# Patient Record
Sex: FEMALE | Race: ASIAN | NOT HISPANIC OR LATINO | Employment: FULL TIME | URBAN - METROPOLITAN AREA
[De-identification: names, ages, dates, MRNs, and addresses within clinical notes are randomized per-mention and may not be internally consistent; named-entity substitution may affect disease eponyms.]

---

## 2022-03-30 LAB
ABO (EXTERNAL): NORMAL
RH (EXTERNAL): POSITIVE

## 2022-04-21 LAB
HEPATITIS B SURFACE ANTIGEN (EXTERNAL): NEGATIVE
HIV1+2 AB SERPL QL IA: NEGATIVE
RUBELLA ANTIBODY IGG (EXTERNAL): NORMAL
VDRL (SYPHILIS) (EXTERNAL): NEGATIVE

## 2022-11-10 LAB — GROUP B STREPTOCOCCUS (EXTERNAL): NEGATIVE

## 2022-11-17 ENCOUNTER — HOSPITAL ENCOUNTER (INPATIENT)
Facility: CLINIC | Age: 22
LOS: 1 days | Discharge: HOME | End: 2022-11-18
Attending: OBSTETRICS & GYNECOLOGY | Admitting: OBSTETRICS & GYNECOLOGY
Payer: COMMERCIAL

## 2022-11-17 PROBLEM — Z34.90 PREGNANCY: Status: ACTIVE | Noted: 2022-11-17

## 2022-11-17 PROBLEM — Z34.90 PREGNANCY: Status: RESOLVED | Noted: 2022-11-17 | Resolved: 2022-11-17

## 2022-11-17 LAB
ABO/RH(D): NORMAL
ANTIBODY SCREEN: NEGATIVE
HOLD SPECIMEN: NORMAL
HOLD SPECIMEN: NORMAL
SARS-COV-2 RNA RESP QL NAA+PROBE: NEGATIVE
SPECIMEN EXPIRATION DATE: NORMAL
T PALLIDUM AB SER QL: NONREACTIVE

## 2022-11-17 PROCEDURE — U0003 INFECTIOUS AGENT DETECTION BY NUCLEIC ACID (DNA OR RNA); SEVERE ACUTE RESPIRATORY SYNDROME CORONAVIRUS 2 (SARS-COV-2) (CORONAVIRUS DISEASE [COVID-19]), AMPLIFIED PROBE TECHNIQUE, MAKING USE OF HIGH THROUGHPUT TECHNOLOGIES AS DESCRIBED BY CMS-2020-01-R: HCPCS | Performed by: OBSTETRICS & GYNECOLOGY

## 2022-11-17 PROCEDURE — 250N000011 HC RX IP 250 OP 636: Performed by: OBSTETRICS & GYNECOLOGY

## 2022-11-17 PROCEDURE — 250N000013 HC RX MED GY IP 250 OP 250 PS 637: Performed by: OBSTETRICS & GYNECOLOGY

## 2022-11-17 PROCEDURE — 36415 COLL VENOUS BLD VENIPUNCTURE: CPT | Performed by: OBSTETRICS & GYNECOLOGY

## 2022-11-17 PROCEDURE — 0UQGXZZ REPAIR VAGINA, EXTERNAL APPROACH: ICD-10-PCS | Performed by: OBSTETRICS & GYNECOLOGY

## 2022-11-17 PROCEDURE — 86901 BLOOD TYPING SEROLOGIC RH(D): CPT | Performed by: OBSTETRICS & GYNECOLOGY

## 2022-11-17 PROCEDURE — 0HQ9XZZ REPAIR PERINEUM SKIN, EXTERNAL APPROACH: ICD-10-PCS | Performed by: OBSTETRICS & GYNECOLOGY

## 2022-11-17 PROCEDURE — C9803 HOPD COVID-19 SPEC COLLECT: HCPCS

## 2022-11-17 PROCEDURE — 86850 RBC ANTIBODY SCREEN: CPT | Performed by: OBSTETRICS & GYNECOLOGY

## 2022-11-17 PROCEDURE — 722N000001 HC LABOR CARE VAGINAL DELIVERY SINGLE

## 2022-11-17 PROCEDURE — 86780 TREPONEMA PALLIDUM: CPT | Performed by: OBSTETRICS & GYNECOLOGY

## 2022-11-17 PROCEDURE — 120N000001 HC R&B MED SURG/OB

## 2022-11-17 PROCEDURE — 250N000009 HC RX 250: Performed by: OBSTETRICS & GYNECOLOGY

## 2022-11-17 RX ORDER — METOCLOPRAMIDE 10 MG/1
10 TABLET ORAL EVERY 6 HOURS PRN
Status: DISCONTINUED | OUTPATIENT
Start: 2022-11-17 | End: 2022-11-17 | Stop reason: HOSPADM

## 2022-11-17 RX ORDER — MISOPROSTOL 200 UG/1
800 TABLET ORAL
Status: DISCONTINUED | OUTPATIENT
Start: 2022-11-17 | End: 2022-11-18 | Stop reason: HOSPADM

## 2022-11-17 RX ORDER — NALOXONE HYDROCHLORIDE 0.4 MG/ML
0.2 INJECTION, SOLUTION INTRAMUSCULAR; INTRAVENOUS; SUBCUTANEOUS
Status: DISCONTINUED | OUTPATIENT
Start: 2022-11-17 | End: 2022-11-17 | Stop reason: HOSPADM

## 2022-11-17 RX ORDER — HYDROCORTISONE 25 MG/G
CREAM TOPICAL 3 TIMES DAILY PRN
Status: DISCONTINUED | OUTPATIENT
Start: 2022-11-17 | End: 2022-11-18 | Stop reason: HOSPADM

## 2022-11-17 RX ORDER — METHYLERGONOVINE MALEATE 0.2 MG/ML
200 INJECTION INTRAVENOUS
Status: DISCONTINUED | OUTPATIENT
Start: 2022-11-17 | End: 2022-11-18 | Stop reason: HOSPADM

## 2022-11-17 RX ORDER — OXYTOCIN/0.9 % SODIUM CHLORIDE 30/500 ML
340 PLASTIC BAG, INJECTION (ML) INTRAVENOUS CONTINUOUS PRN
Status: DISCONTINUED | OUTPATIENT
Start: 2022-11-17 | End: 2022-11-17 | Stop reason: HOSPADM

## 2022-11-17 RX ORDER — IBUPROFEN 800 MG/1
800 TABLET, FILM COATED ORAL EVERY 6 HOURS PRN
Status: DISCONTINUED | OUTPATIENT
Start: 2022-11-17 | End: 2022-11-18 | Stop reason: HOSPADM

## 2022-11-17 RX ORDER — BISACODYL 10 MG
10 SUPPOSITORY, RECTAL RECTAL DAILY PRN
Status: DISCONTINUED | OUTPATIENT
Start: 2022-11-17 | End: 2022-11-18 | Stop reason: HOSPADM

## 2022-11-17 RX ORDER — CITRIC ACID/SODIUM CITRATE 334-500MG
30 SOLUTION, ORAL ORAL
Status: DISCONTINUED | OUTPATIENT
Start: 2022-11-17 | End: 2022-11-17 | Stop reason: HOSPADM

## 2022-11-17 RX ORDER — METHYLERGONOVINE MALEATE 0.2 MG/ML
200 INJECTION INTRAVENOUS
Status: DISCONTINUED | OUTPATIENT
Start: 2022-11-17 | End: 2022-11-17 | Stop reason: HOSPADM

## 2022-11-17 RX ORDER — PRENATAL VIT/IRON FUM/FOLIC AC 27MG-0.8MG
1 TABLET ORAL DAILY
COMMUNITY

## 2022-11-17 RX ORDER — ONDANSETRON 2 MG/ML
4 INJECTION INTRAMUSCULAR; INTRAVENOUS EVERY 6 HOURS PRN
Status: DISCONTINUED | OUTPATIENT
Start: 2022-11-17 | End: 2022-11-17 | Stop reason: HOSPADM

## 2022-11-17 RX ORDER — MISOPROSTOL 200 UG/1
400 TABLET ORAL
Status: DISCONTINUED | OUTPATIENT
Start: 2022-11-17 | End: 2022-11-18 | Stop reason: HOSPADM

## 2022-11-17 RX ORDER — OXYTOCIN 10 [USP'U]/ML
10 INJECTION, SOLUTION INTRAMUSCULAR; INTRAVENOUS
Status: COMPLETED | OUTPATIENT
Start: 2022-11-17 | End: 2022-11-17

## 2022-11-17 RX ORDER — DOCUSATE SODIUM 100 MG/1
100 CAPSULE, LIQUID FILLED ORAL DAILY
Status: DISCONTINUED | OUTPATIENT
Start: 2022-11-17 | End: 2022-11-18 | Stop reason: HOSPADM

## 2022-11-17 RX ORDER — OXYTOCIN 10 [USP'U]/ML
10 INJECTION, SOLUTION INTRAMUSCULAR; INTRAVENOUS
Status: DISCONTINUED | OUTPATIENT
Start: 2022-11-17 | End: 2022-11-18 | Stop reason: HOSPADM

## 2022-11-17 RX ORDER — OXYTOCIN/0.9 % SODIUM CHLORIDE 30/500 ML
100-340 PLASTIC BAG, INJECTION (ML) INTRAVENOUS CONTINUOUS PRN
Status: DISCONTINUED | OUTPATIENT
Start: 2022-11-17 | End: 2022-11-18 | Stop reason: HOSPADM

## 2022-11-17 RX ORDER — PROCHLORPERAZINE MALEATE 10 MG
10 TABLET ORAL EVERY 6 HOURS PRN
Status: DISCONTINUED | OUTPATIENT
Start: 2022-11-17 | End: 2022-11-17 | Stop reason: HOSPADM

## 2022-11-17 RX ORDER — MODIFIED LANOLIN
OINTMENT (GRAM) TOPICAL
Status: DISCONTINUED | OUTPATIENT
Start: 2022-11-17 | End: 2022-11-18 | Stop reason: HOSPADM

## 2022-11-17 RX ORDER — OXYTOCIN 10 [USP'U]/ML
10 INJECTION, SOLUTION INTRAMUSCULAR; INTRAVENOUS
Status: DISCONTINUED | OUTPATIENT
Start: 2022-11-17 | End: 2022-11-17 | Stop reason: HOSPADM

## 2022-11-17 RX ORDER — CARBOPROST TROMETHAMINE 250 UG/ML
250 INJECTION, SOLUTION INTRAMUSCULAR
Status: DISCONTINUED | OUTPATIENT
Start: 2022-11-17 | End: 2022-11-18 | Stop reason: HOSPADM

## 2022-11-17 RX ORDER — KETOROLAC TROMETHAMINE 30 MG/ML
30 INJECTION, SOLUTION INTRAMUSCULAR; INTRAVENOUS
Status: DISCONTINUED | OUTPATIENT
Start: 2022-11-17 | End: 2022-11-18 | Stop reason: HOSPADM

## 2022-11-17 RX ORDER — MISOPROSTOL 200 UG/1
800 TABLET ORAL
Status: DISCONTINUED | OUTPATIENT
Start: 2022-11-17 | End: 2022-11-17 | Stop reason: HOSPADM

## 2022-11-17 RX ORDER — NALOXONE HYDROCHLORIDE 0.4 MG/ML
0.4 INJECTION, SOLUTION INTRAMUSCULAR; INTRAVENOUS; SUBCUTANEOUS
Status: DISCONTINUED | OUTPATIENT
Start: 2022-11-17 | End: 2022-11-17 | Stop reason: HOSPADM

## 2022-11-17 RX ORDER — ACETAMINOPHEN 325 MG/1
650 TABLET ORAL EVERY 4 HOURS PRN
Status: DISCONTINUED | OUTPATIENT
Start: 2022-11-17 | End: 2022-11-18 | Stop reason: HOSPADM

## 2022-11-17 RX ORDER — IBUPROFEN 800 MG/1
800 TABLET, FILM COATED ORAL
Status: DISCONTINUED | OUTPATIENT
Start: 2022-11-17 | End: 2022-11-18 | Stop reason: HOSPADM

## 2022-11-17 RX ORDER — MISOPROSTOL 200 UG/1
400 TABLET ORAL
Status: DISCONTINUED | OUTPATIENT
Start: 2022-11-17 | End: 2022-11-17 | Stop reason: HOSPADM

## 2022-11-17 RX ORDER — CARBOPROST TROMETHAMINE 250 UG/ML
250 INJECTION, SOLUTION INTRAMUSCULAR
Status: DISCONTINUED | OUTPATIENT
Start: 2022-11-17 | End: 2022-11-17 | Stop reason: HOSPADM

## 2022-11-17 RX ORDER — METOCLOPRAMIDE HYDROCHLORIDE 5 MG/ML
10 INJECTION INTRAMUSCULAR; INTRAVENOUS EVERY 6 HOURS PRN
Status: DISCONTINUED | OUTPATIENT
Start: 2022-11-17 | End: 2022-11-17 | Stop reason: HOSPADM

## 2022-11-17 RX ORDER — OXYTOCIN/0.9 % SODIUM CHLORIDE 30/500 ML
340 PLASTIC BAG, INJECTION (ML) INTRAVENOUS CONTINUOUS PRN
Status: DISCONTINUED | OUTPATIENT
Start: 2022-11-17 | End: 2022-11-18 | Stop reason: HOSPADM

## 2022-11-17 RX ORDER — ONDANSETRON 4 MG/1
4 TABLET, ORALLY DISINTEGRATING ORAL EVERY 6 HOURS PRN
Status: DISCONTINUED | OUTPATIENT
Start: 2022-11-17 | End: 2022-11-17 | Stop reason: HOSPADM

## 2022-11-17 RX ORDER — PROCHLORPERAZINE 25 MG
25 SUPPOSITORY, RECTAL RECTAL EVERY 12 HOURS PRN
Status: DISCONTINUED | OUTPATIENT
Start: 2022-11-17 | End: 2022-11-17 | Stop reason: HOSPADM

## 2022-11-17 RX ORDER — LIDOCAINE 40 MG/G
CREAM TOPICAL
Status: DISCONTINUED | OUTPATIENT
Start: 2022-11-17 | End: 2022-11-17 | Stop reason: HOSPADM

## 2022-11-17 RX ADMIN — EPSOM SALT: 1 GRANULE ORAL; TOPICAL at 18:23

## 2022-11-17 RX ADMIN — ACETAMINOPHEN 650 MG: 325 TABLET, FILM COATED ORAL at 13:40

## 2022-11-17 RX ADMIN — DOCUSATE SODIUM 100 MG: 100 CAPSULE, LIQUID FILLED ORAL at 21:43

## 2022-11-17 RX ADMIN — ACETAMINOPHEN 650 MG: 325 TABLET, FILM COATED ORAL at 23:17

## 2022-11-17 RX ADMIN — OXYTOCIN 10 UNITS: 10 INJECTION INTRAVENOUS at 08:44

## 2022-11-17 RX ADMIN — BENZOCAINE AND LEVOMENTHOL: 200; 5 SPRAY TOPICAL at 13:40

## 2022-11-17 RX ADMIN — DOCUSATE SODIUM 100 MG: 100 CAPSULE, LIQUID FILLED ORAL at 10:22

## 2022-11-17 RX ADMIN — ACETAMINOPHEN 650 MG: 325 TABLET, FILM COATED ORAL at 18:32

## 2022-11-17 RX ADMIN — HYDROCORTISONE: 25 CREAM TOPICAL at 18:23

## 2022-11-17 RX ADMIN — IBUPROFEN 800 MG: 800 TABLET ORAL at 15:02

## 2022-11-17 RX ADMIN — IBUPROFEN 800 MG: 800 TABLET ORAL at 21:43

## 2022-11-17 RX ADMIN — KETOROLAC TROMETHAMINE 30 MG: 30 INJECTION, SOLUTION INTRAMUSCULAR; INTRAVENOUS at 08:51

## 2022-11-17 RX ADMIN — LIDOCAINE HYDROCHLORIDE 20 ML: 10 INJECTION, SOLUTION INFILTRATION; PERINEURAL at 08:48

## 2022-11-17 ASSESSMENT — ACTIVITIES OF DAILY LIVING (ADL)
ADLS_ACUITY_SCORE: 18
DRESSING/BATHING_DIFFICULTY: NO
TOILETING_ISSUES: NO
WALKING_OR_CLIMBING_STAIRS_DIFFICULTY: NO
CHANGE_IN_FUNCTIONAL_STATUS_SINCE_ONSET_OF_CURRENT_ILLNESS/INJURY: NO
ADLS_ACUITY_SCORE: 18
ADLS_ACUITY_SCORE: 18
DOING_ERRANDS_INDEPENDENTLY_DIFFICULTY: NO
ADLS_ACUITY_SCORE: 18
HEARING_DIFFICULTY_OR_DEAF: NO
ADLS_ACUITY_SCORE: 18
WEAR_GLASSES_OR_BLIND: NO
CONCENTRATING,_REMEMBERING_OR_MAKING_DECISIONS_DIFFICULTY: NO
FALL_HISTORY_WITHIN_LAST_SIX_MONTHS: NO
DIFFICULTY_EATING/SWALLOWING: NO
ADLS_ACUITY_SCORE: 18
DIFFICULTY_COMMUNICATING: NO

## 2022-11-17 NOTE — PLAN OF CARE
Patient up to tub x 1, voiding independently and doing perineal care of ice pack, tucks, dermaplast.  Pain being managed with oral pain meds and topical.  Working with mom on breast feeding every 3 hours.  Baby able to latch at 1500 feeding and fed well.

## 2022-11-17 NOTE — L&D DELIVERY NOTE
OB Vaginal Delivery Note    Balbir Pearson MRN# [id number]   Age: 22 year old YOB: 2000     Predelivery Diagnosis:  1) 22 year old  at 37w5d      2) GBS negative   3) COVID negative  4) SROM with subsequent spontaneous labor     Postdelivery Diagnosis:  1) 22 year old  @ 37w5d   2) Delivery of a viable male     Delivery Type: Vaginal, Spontaneous    Weight: 2.892 kg (6 lb 6 oz)    1 Minute 5 Minute 10 Minute   Apgar Totals: 8   9        Delivery Personnel: BLAKE LAU;MALCOM HANNA   Delivery Physician: Areli Vang MD    Augmentation: N/A  Induction: N/A    Internal Monitors: None   ROM:  SROM  Fluid type: Clear  Labor analgesia/anesthesia: none     Labor Complications: None   Delivery QBL:  150 ml     Delivery Details:  Balbir Pearson, a 22 year old  female with prenatal care with [facility], and pregnancy complicated by nothing, presented to L&D with SROM and subsequent labor.    Her labor course was complicated by nothing.  Patient became fully dilated and pushed in active labor. Delivery was via vaginal, spontaneous  under none  anesthesia. Infant delivered in vertex  middle  occiput  anterior  position. The shoulders were delivered in usual fashion.  The cord was clamped x 2, cut, and 3 vessels  were noted.     Live, vigorous infant was handed to Mom.      Shoulder Dystocia: No     Operative Vaginal Delivery: No    Cord complications: nuchal   Placenta delivered, was inspected and found to be intact.  Placental disposition was Hospital disposal .     Episiotomy: none    Perineum, vagina, cervix were inspected, and the following lacerations were noted:   Perineal lacerations: 1st  degree perineal with a left vaginal sulcal tear, repaired. Right periurethral hemostatic and not repaired. Any lacerations were repaired in the standard fashion using local anesthesia and 3-0 vicryl suture.  Excellent hemostasis was noted. Needle/sharps count, laparotomy sponge count was  correct.     Infant and patient in delivery room in good and stable condition.     Delivery was complicated by nothing.  Interventions included IM pitocin.     Areli Vang MD       Dann Pearson [[id number]]    Labor Event Times    Labor onset date: 22 Onset time:  4:30 AM   Dilation complete date: 22    Start pushing date/time: 2022 0800      Labor Events     labor?: No   steroids: None  Labor Type: Spontaneous  Predominate monitoring during 1st stage: continuous electronic fetal monitoring     Antibiotics received during labor?: No     Rupture date/time: 22 0330   Rupture type: Spontaneous rupture of membranes occuring during spontaneous labor or augmentation  Fluid color: Clear  Fluid odor: Normal     Augmentation: None  1:1 continuous labor support provided by?: RN Labor partogram used?: no      Delivery/Placenta Date and Time    Delivery Date: 22 Delivery Time:  8:36 AM   Placenta Date/Time: 2022  8:43 AM  Oxytocin given at the time of delivery: after delivery of placenta  Delivering clinician: Areli Vang MD   Other personnel present at delivery:  Provider Role   Blake Lau RN Dirkes, Meghan, RN          Vaginal Counts     Initial count performed by 2 team members:  Two Team Members   nidia vang       Needles Suture Needles Sponges (RETIRED) Instruments   Initial counts 1 1 5    Added to count       Relief counts       Final counts             Placed during labor Accounted for at the end of labor   FSE No NA   IUPC No NA   Cervidil No NA              Final count performed by 2 team members:  Two Team Members   nidia vang         Apgars    Living status: Living   1 Minute 5 Minute 10 Minute 15 Minute 20 Minute   Skin color: 1  1       Heart rate: 2  2       Reflex irritability: 2  2       Muscle tone: 2  2       Respiratory effort: 1  2       Total: 8  9       Apgars assigned by: BLAKE LAU RN     Cord    Vessels:  "3 Vessels    Cord Complications: Nuchal   Nuchal Intervention: delivered through         Nuchal cord description: loose nuchal cord         Cord Blood Disposition: Discard    Gases Sent?: No    Delayed cord clamping?: Yes    Cord Clamping Delay (seconds):  seconds    Stem cell collection?: No        Resuscitation    Methods: None      Measurements    Weight: 6 lb 6 oz Length: 1' 6.5\"   Head circumference: 32.5 cm       Skin to Skin and Feeding Plan    Skin to skin initiation date/time: 1841    Skin to skin with: Mother  Skin to skin end date/time:        Labor Events and Shoulder Dystocia    Fetal Tracing Prior to Delivery: Category 2  Shoulder dystocia present?: Neg     Delivery (Maternal) (Provider to Complete) (829700)    Episiotomy: None  Perineal lacerations: 1st Repaired?: Yes   Periurethral laceration: right Repaired?: No   Vaginal laceration?: Yes Repaired?: Yes   Repair suture: 3-0 Vicryl  Genital tract inspection done: Pos     Blood Loss  Mother: Balbir Pearson #[id number]   Start of Mother's Information    Delivery Blood Loss  22 0430 - 22 0935    None           End of Mother's Information  Mother: Balbir Pearson #[id number]          Delivery - Provider to Complete ([id number])    Delivering clinician: Areli Vang MD  Delivery Type (Choose the 1 that will go to the Birth History): Vaginal, Spontaneous                   Other personnel:  Provider Role   Lorraine Mcghee, ESTHER    DirEsperanza dallas, ESTHER                 Placenta    Date/Time: 2022  8:43 AM  Removal: Spontaneous  Disposition: Hospital disposal           Anesthesia    Method: None                Presentation and Position    Presentation: Vertex    Position: Middle Occiput Anterior                 Areli Vang MD    2022 9:16 AM     "

## 2022-11-17 NOTE — H&P
[facility] Labor and Delivery History and Physical    Balbir Pearson MRN# [id number]   Age: 22 year old YOB: 2000     Date of Admission:  2022    Primary care provider: Brisa Adamson           Chief Complaint:   Balbir Pearson is a 22 year old female who is 37w5d pregnant and being admitted for SROM. Subsequently began painful contractions.           Pregnancy history:     OBSTETRIC HISTORY:    OB History    Para Term  AB Living   1 0 0 0 0 0   SAB IAB Ectopic Multiple Live Births   0 0 0 0 0      # Outcome Date GA Lbr Rayray/2nd Weight Sex Delivery Anes PTL Lv   1 Current                EDC: Estimated Date of Delivery: 12/3/22    1) Asymptomatic bacteriuria, negative test of cure  2) GBS negative  3) COVID negative    Prenatal Labs:   Lab Results   Component Value Date    AS Negative 2022       GBS Status:   Lab Results   Component Value Date    GBS Negative 11/10/2022       Active Problem List  Patient Active Problem List   Diagnosis     Pregnancy       Medication Prior to Admission  Medications Prior to Admission   Medication Sig Dispense Refill Last Dose     Prenatal Vit-Fe Fumarate-FA (PRENATAL MULTIVITAMIN W/IRON) 27-0.8 MG tablet Take 1 tablet by mouth daily   2022   .        Maternal Past Medical History:   No past medical history on file.                    Family History:   No family history on file.  Family history reviewed            Social History:     Social History     Tobacco Use     Smoking status: Not on file     Smokeless tobacco: Not on file   Substance Use Topics     Alcohol use: Not on file            Review of Systems:   The Review of Systems is negative other than noted in the HPI          Physical Exam:     Vitals were reviewed  Patient Vitals for the past 8 hrs:   BP Temp Temp src Height Weight   22 0723 115/76 97.7  F (36.5  C) Oral -- --   22 0630 -- 98.4  F (36.9  C) Oral -- --   22 0510 109/64 98  F (36.7  " C) Oral 1.638 m (5' 4.5\") 64 kg (141 lb)   22 0412 115/81 97.7  F (36.5  C) Oral -- --     Constitutional:   awake, alert, cooperative, distress with contractions, and appears stated age     Lungs:   No increased work of breathing, good air exchange     Cardiovascular:   Regular rate and rhythm     Abdomen:   Gravid, soft between contractions     Musculoskeletal:   no lower extremity pitting edema present  there is no redness, warmth, or swelling of the joints     Neuropsychiatric:   General: normal, calm and normal eye contact  Level of consciousness: alert / normal  Affect: normal      Cervix:   Membranes: SROM   Dilation: 9.5 with a reducible lip   Effacement: 100%   Station:+1   Consistency: average   Position: Anterior  Presentation:Cephalic  Fetal Heart Rate Tracing: reactive and reassuring  Tocometer: external monitor and frequency q 2-3 minutes                       Assessment:   Balbir Pearson is a 37w5d pregnant female admitted with SROM and subsequent spontaneous labor        Plan:   Admit - see IP orders  Anticipate     Areli Vang MD  "

## 2022-11-17 NOTE — PROGRESS NOTES
Pt arrived to unit with c/o SROM, clear fluid. Pt continues to leak copious amount of fluid. Updated Dr Vang, plan to admit pt.

## 2022-11-18 VITALS
SYSTOLIC BLOOD PRESSURE: 105 MMHG | HEART RATE: 69 BPM | WEIGHT: 141 LBS | BODY MASS INDEX: 23.49 KG/M2 | DIASTOLIC BLOOD PRESSURE: 98 MMHG | TEMPERATURE: 98.6 F | RESPIRATION RATE: 18 BRPM | OXYGEN SATURATION: 98 % | HEIGHT: 65 IN

## 2022-11-18 PROCEDURE — 250N000013 HC RX MED GY IP 250 OP 250 PS 637: Performed by: OBSTETRICS & GYNECOLOGY

## 2022-11-18 RX ORDER — IBUPROFEN 800 MG/1
800 TABLET, FILM COATED ORAL EVERY 8 HOURS PRN
Qty: 30 TABLET | Refills: 1 | Status: SHIPPED | OUTPATIENT
Start: 2022-11-18

## 2022-11-18 RX ADMIN — ACETAMINOPHEN 650 MG: 325 TABLET, FILM COATED ORAL at 04:25

## 2022-11-18 RX ADMIN — ACETAMINOPHEN 650 MG: 325 TABLET, FILM COATED ORAL at 09:45

## 2022-11-18 RX ADMIN — IBUPROFEN 800 MG: 800 TABLET ORAL at 10:57

## 2022-11-18 RX ADMIN — DOCUSATE SODIUM 100 MG: 100 CAPSULE, LIQUID FILLED ORAL at 09:45

## 2022-11-18 RX ADMIN — IBUPROFEN 800 MG: 800 TABLET ORAL at 04:25

## 2022-11-18 ASSESSMENT — ACTIVITIES OF DAILY LIVING (ADL)
ADLS_ACUITY_SCORE: 18

## 2022-11-18 NOTE — PLAN OF CARE
Problem: Postpartum (Vaginal Delivery)  Goal: Successful Maternal Role Transition  Outcome: Met  Goal: Hemostasis  Outcome: Met  Goal: Absence of Infection Signs and Symptoms  Outcome: Met  Goal: Optimal Pain Control and Function  Outcome: Met

## 2022-11-18 NOTE — PROGRESS NOTES
Called to stand by until provider arrived for possible precipitous delivery.  21yo G1 at 37w5d who arrived in spontaneous labor.  She progressed to 9cm and has veyr strong desire to push. On exam was anterior lip on right side.  FHT Cat I.  I stool at bedside for 25 minutes until Dr. Vang arrived for delivery.      Marlene Curtis MD

## 2022-11-18 NOTE — DISCHARGE SUMMARY
[facility] Discharge Summary    Balbir Pearson MRN# [id number]   Age: 22 year old YOB: 2000     Date of Admission:  2022  Date of Discharge::  2022  Admitting Physician:  Areli Vang MD  Discharge Physician:  Areli Vang MD     Home clinic: [facility]          Admission Diagnoses:   1) 23yo  at 37w5d  2) SROM and subsequent spontaneous labor  3) GBS negative  4) COVID negative          Discharge Diagnosis:   Normal spontaneous vaginal delivery  Intrauterine pregnancy at 37w5d gestation  Male infant, 6#6, APGARs 8/9  Vulvar hematoma, small and stable in size          Procedures:   Procedure(s): No additional procedures performed       No other procedures performed during this admission           Medications Prior to Admission:     Medications Prior to Admission   Medication Sig Dispense Refill Last Dose     Prenatal Vit-Fe Fumarate-FA (PRENATAL MULTIVITAMIN W/IRON) 27-0.8 MG tablet Take 1 tablet by mouth daily   2022             Discharge Medications:     Current Discharge Medication List      START taking these medications    Details   ibuprofen (ADVIL/MOTRIN) 800 MG tablet Take 1 tablet (800 mg) by mouth every 8 hours as needed for moderate pain (4-6)  Qty: 30 tablet, Refills: 1    Associated Diagnoses: Normal delivery         CONTINUE these medications which have NOT CHANGED    Details   Prenatal Vit-Fe Fumarate-FA (PRENATAL MULTIVITAMIN W/IRON) 27-0.8 MG tablet Take 1 tablet by mouth daily                   Consultations:   Social work due to high postpartum depression screen          Brief History of Labor:   Patient presented with SROM and initially no contractions. Began having contractions a few hours after admission and progressed quickly to 9 cm. Underwent .           Hospital Course:   The patient's hospital course was unremarkable. She did have a small (2-3 cm) vulvar hematoma, stable in size and not causing concerns. On discharge, her pain  was well controlled. Vaginal bleeding is similar to peak menstrual flow.  Voiding without difficulty.  Ambulating well and tolerating a normal diet.  No fever.  Breastfeeding ok with supplementation.  Infant is stable.  No bowel movement yet.  She was discharged on post-partum day #1.    Post-partum hemoglobin: No results found for: HGB          Discharge Instructions and Follow-Up:   Discharge diet: Regular   Discharge activity: No heavy lifting, pushing, pulling for 6 week(s)  Pelvic rest: abstain from intercourse and do not use tampons for 6 week(s)   Discharge follow-up: Follow up with [facility] in 6 weeks, sooner with concerns of peripartum mood issues   Wound care: Drink plenty of fluids  Ice to area for comfort  Keep wound clean and dry           Discharge Disposition:   Discharged to home      Attestation:  I have reviewed today's vital signs, notes, medications, labs and imaging.    Areli Vang MD      7

## 2022-11-18 NOTE — PROGRESS NOTES
Patient discharged home with NB. All discharge instructions reviewed with patient and significant other and given to patient. All questions answered to patient's satisfaction.

## 2022-11-18 NOTE — CONSULTS
Met with pt regarding elevated Streator score.  Pt sates history of depression in high school.  Pt is not currently or in the past been prescribed mediation for depression.  Pt has not been to therapist in past but is interested in seeing a therapist.  Pt requesting mental health clinic resources at this time and wanting to set up own appointment.  Resources provided to pt.  Pt feels safe in returning home, feels well supported.  Pt denies any suicidal or homicidal thoughts at this time.  Per chart pt will have home care visit after discharge.  JOURDAN Ann  [facility]  11/18/2022   2:48 PM

## 2022-11-18 NOTE — PROGRESS NOTES
"Outreach Note for EPIC    Chart reviewed, discharge plan discussed with patient, needs assessed. Patient verbalizes understanding of plan, requests [facility] visit as ordered, [facility] nurse visit planned for Sun, , Home Care Intake updated. Patient and , \"Raina Barnhart\", phone number is reported as correct in EMR.    Patient states she has good support at home, has baby care essentials, and feels ready to discharge today. Outreach RN will continue to follow and assist if needed with discharge plan. No additional needs identified at this time.        "

## 2022-11-18 NOTE — DISCHARGE INSTRUCTIONS
A Homecare Visit is set up on Sun, Nov 20th. The RN will call you after 4 p.m. the evening before the visit with a time. Please do not make a clinic visit for the same day as your Homecare Visit. You can contact [facility] at [phone] if you have any further questions related to the home visit.  Postpartum Vaginal Delivery Instructions    Activity     Ask family and friends for help when you need it.  Do not place anything in your vagina for 6 weeks.  You are not restricted on other activities, but take it easy for a few weeks to allow your body to recover from delivery.  You are able to do any activities you feel up to that point.  No driving until you have stopped taking your pain medications (usually two weeks after delivery).     Call your health care provider if you have any of these symptoms:     Increased pain, swelling, redness, or fluid around your stiches from an episiotomy or perineal tear.  A fever above 100.4 F (38 C) with or without chills when placing a thermometer under your tongue.  You soak a sanitary pad with blood within 1 hour, or you see blood clots larger than a golf ball.  Bleeding that lasts more than 6 weeks.  Vaginal discharge that smells bad.  Severe pain, cramping or tenderness in your lower belly area.  A need to urinate more frequently (use the toilet more often), more urgently (use the toilet very quickly), or it burns when you urinate.  Nausea and vomiting.  Redness, swelling or pain around a vein in your leg.  Problems breastfeeding or a red or painful area on your breast.  Chest pain and cough or are gasping for air.  Problems coping with sadness, anxiety, or depression.  If you have any concerns about hurting yourself or the baby, call your provider immediately.   You have questions or concerns after you return home.     Keep your hands clean:  Always wash your hands before touching your perineal area and stitches.  This helps reduce your risk of infection.  If your  hands aren't dirty, you may use an alcohol hand-rub to clean your hands. Keep your nails clean and short.

## 2023-02-10 ENCOUNTER — MEDICAL CORRESPONDENCE (OUTPATIENT)
Dept: HEALTH INFORMATION MANAGEMENT | Facility: CLINIC | Age: 23
End: 2023-02-10

## 2023-02-12 ENCOUNTER — HEALTH MAINTENANCE LETTER (OUTPATIENT)
Age: 23
End: 2023-02-12

## 2023-04-11 ENCOUNTER — MEDICAL CORRESPONDENCE (OUTPATIENT)
Dept: HEALTH INFORMATION MANAGEMENT | Facility: CLINIC | Age: 23
End: 2023-04-11

## 2023-10-11 ENCOUNTER — OFFICE VISIT (OUTPATIENT)
Dept: FAMILY MEDICINE | Facility: CLINIC | Age: 23
End: 2023-10-11
Payer: COMMERCIAL

## 2023-10-11 ENCOUNTER — HOSPITAL ENCOUNTER (OUTPATIENT)
Dept: CT IMAGING | Facility: HOSPITAL | Age: 23
Discharge: HOME | End: 2023-10-11
Attending: FAMILY MEDICINE | Admitting: FAMILY MEDICINE
Payer: COMMERCIAL

## 2023-10-11 ENCOUNTER — NURSE TRIAGE (OUTPATIENT)
Dept: NURSING | Facility: CLINIC | Age: 23
End: 2023-10-11
Payer: COMMERCIAL

## 2023-10-11 VITALS
RESPIRATION RATE: 16 BRPM | TEMPERATURE: 103 F | OXYGEN SATURATION: 99 % | WEIGHT: 116 LBS | BODY MASS INDEX: 19.6 KG/M2 | SYSTOLIC BLOOD PRESSURE: 104 MMHG | HEART RATE: 117 BPM | DIASTOLIC BLOOD PRESSURE: 66 MMHG

## 2023-10-11 DIAGNOSIS — M54.50 ACUTE BILATERAL LOW BACK PAIN WITHOUT SCIATICA: ICD-10-CM

## 2023-10-11 DIAGNOSIS — R50.9 FEVER, UNSPECIFIED FEVER CAUSE: ICD-10-CM

## 2023-10-11 DIAGNOSIS — R50.9 FEVER, UNSPECIFIED FEVER CAUSE: Primary | ICD-10-CM

## 2023-10-11 LAB
ALBUMIN UR-MCNC: NEGATIVE MG/DL
APPEARANCE UR: CLEAR
BACTERIA #/AREA URNS HPF: ABNORMAL /HPF
BASO+EOS+MONOS # BLD AUTO: NORMAL 10*3/UL
BASO+EOS+MONOS NFR BLD AUTO: NORMAL %
BASOPHILS # BLD AUTO: 0 10E3/UL (ref 0–0.2)
BASOPHILS NFR BLD AUTO: 1 %
BILIRUB UR QL STRIP: NEGATIVE
COLOR UR AUTO: YELLOW
DEPRECATED S PYO AG THROAT QL EIA: NEGATIVE
EOSINOPHIL # BLD AUTO: 0 10E3/UL (ref 0–0.7)
EOSINOPHIL NFR BLD AUTO: 0 %
ERYTHROCYTE [DISTWIDTH] IN BLOOD BY AUTOMATED COUNT: 12.1 % (ref 10–15)
FLUAV RNA SPEC QL NAA+PROBE: NEGATIVE
FLUBV RNA RESP QL NAA+PROBE: NEGATIVE
GLUCOSE UR STRIP-MCNC: NEGATIVE MG/DL
GROUP A STREP BY PCR: NOT DETECTED
HCG UR QL: NEGATIVE
HCT VFR BLD AUTO: 39.7 % (ref 35–47)
HGB BLD-MCNC: 13.7 G/DL (ref 11.7–15.7)
HGB UR QL STRIP: ABNORMAL
IMM GRANULOCYTES # BLD: 0 10E3/UL
IMM GRANULOCYTES NFR BLD: 0 %
KETONES UR STRIP-MCNC: 40 MG/DL
LEUKOCYTE ESTERASE UR QL STRIP: NEGATIVE
LYMPHOCYTES # BLD AUTO: 0.9 10E3/UL (ref 0.8–5.3)
LYMPHOCYTES NFR BLD AUTO: 20 %
MCH RBC QN AUTO: 31.1 PG (ref 26.5–33)
MCHC RBC AUTO-ENTMCNC: 34.5 G/DL (ref 31.5–36.5)
MCV RBC AUTO: 90 FL (ref 78–100)
MONOCYTES # BLD AUTO: 0.5 10E3/UL (ref 0–1.3)
MONOCYTES NFR BLD AUTO: 11 %
MUCOUS THREADS #/AREA URNS LPF: PRESENT /LPF
NEUTROPHILS # BLD AUTO: 3 10E3/UL (ref 1.6–8.3)
NEUTROPHILS NFR BLD AUTO: 69 %
NITRATE UR QL: NEGATIVE
PH UR STRIP: 5.5 [PH] (ref 5–8)
PLATELET # BLD AUTO: 243 10E3/UL (ref 150–450)
RBC # BLD AUTO: 4.41 10E6/UL (ref 3.8–5.2)
RBC #/AREA URNS AUTO: ABNORMAL /HPF
RSV RNA SPEC NAA+PROBE: NEGATIVE
SARS-COV-2 RNA RESP QL NAA+PROBE: POSITIVE
SP GR UR STRIP: 1.02 (ref 1–1.03)
SQUAMOUS #/AREA URNS AUTO: ABNORMAL /LPF
UROBILINOGEN UR STRIP-ACNC: 0.2 E.U./DL
WBC # BLD AUTO: 4.4 10E3/UL (ref 4–11)
WBC #/AREA URNS AUTO: ABNORMAL /HPF

## 2023-10-11 PROCEDURE — 99000 SPECIMEN HANDLING OFFICE-LAB: CPT | Performed by: FAMILY MEDICINE

## 2023-10-11 PROCEDURE — 87651 STREP A DNA AMP PROBE: CPT | Performed by: FAMILY MEDICINE

## 2023-10-11 PROCEDURE — 87637 SARSCOV2&INF A&B&RSV AMP PRB: CPT | Performed by: FAMILY MEDICINE

## 2023-10-11 PROCEDURE — 87798 DETECT AGENT NOS DNA AMP: CPT | Mod: 90 | Performed by: FAMILY MEDICINE

## 2023-10-11 PROCEDURE — 85025 COMPLETE CBC W/AUTO DIFF WBC: CPT | Performed by: FAMILY MEDICINE

## 2023-10-11 PROCEDURE — 74177 CT ABD & PELVIS W/CONTRAST: CPT

## 2023-10-11 PROCEDURE — 250N000011 HC RX IP 250 OP 636: Mod: JZ | Performed by: FAMILY MEDICINE

## 2023-10-11 PROCEDURE — 81001 URINALYSIS AUTO W/SCOPE: CPT | Performed by: FAMILY MEDICINE

## 2023-10-11 PROCEDURE — 81025 URINE PREGNANCY TEST: CPT | Performed by: FAMILY MEDICINE

## 2023-10-11 PROCEDURE — 87563 M. GENITALIUM AMP PROBE: CPT | Mod: 90 | Performed by: FAMILY MEDICINE

## 2023-10-11 PROCEDURE — 99205 OFFICE O/P NEW HI 60 MIN: CPT | Performed by: FAMILY MEDICINE

## 2023-10-11 PROCEDURE — 36415 COLL VENOUS BLD VENIPUNCTURE: CPT | Performed by: FAMILY MEDICINE

## 2023-10-11 RX ORDER — IOPAMIDOL 755 MG/ML
90 INJECTION, SOLUTION INTRAVASCULAR ONCE
Status: COMPLETED | OUTPATIENT
Start: 2023-10-11 | End: 2023-10-11

## 2023-10-11 RX ORDER — CEFDINIR 300 MG/1
300 CAPSULE ORAL 2 TIMES DAILY
Qty: 14 CAPSULE | Refills: 0 | Status: SHIPPED | OUTPATIENT
Start: 2023-10-11 | End: 2023-10-12

## 2023-10-11 RX ORDER — DOXYCYCLINE 100 MG/1
100 CAPSULE ORAL 2 TIMES DAILY
Qty: 14 CAPSULE | Refills: 0 | Status: SHIPPED | OUTPATIENT
Start: 2023-10-11 | End: 2023-10-12

## 2023-10-11 RX ADMIN — IOPAMIDOL 90 ML: 755 INJECTION, SOLUTION INTRAVENOUS at 18:24

## 2023-10-11 NOTE — TELEPHONE ENCOUNTER
Not feeling well. Wants to make sure it's not a kidney infection. History of UTI. Has fever 100.9, lower back pain.  I connected with scheduling for an appointment and advised urgent care if they can't get her in.  Dian Marquez RN  [facility]      Reason for Disposition   Pain radiates into groin, scrotum    Additional Information   Negative: Passed out (i.e., fainted, collapsed and was not responding)   Negative: Shock suspected (e.g., cold/pale/clammy skin, too weak to stand, low BP, rapid pulse)   Negative: Sounds like a life-threatening emergency to the triager   Negative: Major injury to the back (e.g., MVA, fall > 10 feet or 3 meters, penetrating injury, etc.)   Negative: Pain in the upper back over the ribs (rib cage) that radiates (travels) into the chest   Negative: Pain in the upper back over the ribs (rib cage) and worsened by coughing (or clearly increases with breathing)   Negative: Back pain during pregnancy   Negative: SEVERE back pain of sudden onset and age > 60 years   Negative: SEVERE abdominal pain (e.g., excruciating)     Pelvic pain at 4   Negative: Abdominal pain and age > 60 years   Negative: Unable to urinate (or only a few drops) and bladder feels very full   Negative: Loss of bladder or bowel control (urine or bowel incontinence; wetting self, leaking stool) of new-onset   Negative: Numbness (loss of sensation) in groin or rectal area    Protocols used: Back Pain-A-OH

## 2023-10-11 NOTE — PROGRESS NOTES
Assessment:       Acute bilateral low back pain without sciatica  - CBC with platelets differential  - UA Macroscopic with reflex to Microscopic and Culture - Clinic Collect  - CBC with platelets differential  - UA Microscopic with Reflex to Culture  - CT Abdomen Pelvis w Contrast  - HCG qualitative urine  - HCG qualitative urine  - Urogenital Ureaplasma and Mycoplasma Species by PCR  - Urogenital Ureaplasma and Mycoplasma Species by PCR    Fever, unspecified fever cause  - CBC with platelets differential  - UA Macroscopic with reflex to Microscopic and Culture - Clinic Collect  - CBC with platelets differential  - UA Microscopic with Reflex to Culture  - CT Abdomen Pelvis w Contrast  - HCG qualitative urine  - HCG qualitative urine  - Streptococcus A Rapid Screen w/Reflex to PCR - Clinic Collect  - Group A Streptococcus PCR Throat Swab  - Urogenital Ureaplasma and Mycoplasma Species by PCR  - Symptomatic Influenza A/B, RSV, & SARS-CoV2 PCR (COVID-19) Nose  - Urogenital Ureaplasma and Mycoplasma Species by PCR         Plan:     Patient presenting with high fevers and bilateral low back pain, etiology unclear.  Initial UA was negative, negative strep.  BC unremarkable discussed all these results with patient.  Because of the low back pain and fevers CT scan of her abdomen and pelvis was done showing evidence of cystitis but no other abnormalities.  Because of these findings of cystitis question whether or not patient may have a Ureaplasma/mycoplasma infection.  Screen for COVID-19/influenza/RSV done given high fevers although not having any upper respiratory symptoms.  Prescription given for doxycycline to cover possible Ureaplasma/mycoplasma infection as well as cefdinir to cover other possible UTI infections although this is less likely with normal UA.  Will await results of COVID-19/influenza/RSV and if any of these are positive we will have patient's stop the cefdinir but continue doxycycline given the concern  for Ureaplasma/mycoplasma cystitis.  Patient expresses understanding.     MEDICATIONS:   Orders Placed This Encounter   Medications    DISCONTD: doxycycline hyclate (VIBRAMYCIN) 100 MG capsule     Sig: Take 1 capsule (100 mg) by mouth 2 times daily for 7 days     Dispense:  14 capsule     Refill:  0    DISCONTD: cefdinir (OMNICEF) 300 MG capsule     Sig: Take 1 capsule (300 mg) by mouth 2 times daily for 7 days     Dispense:  14 capsule     Refill:  0     Consulted with colleagues regarding this case in the urgent care.  65 minutes spent in chart review, patient history, physical exam, ordering test, reviewing tests, ordering CT scan and reviewing CT scan and discussing plan of care with patient and documentation  Subjective:       23 year old female presents for evaluation of a several day history of intense low back pain and fevers of 103 with shaking chills and body aches.  She denies significant burning with urination but has had some increased urinary frequency and urgency.  Denies nasal congestion, cough, skin rash, sore throat, nausea, or vomiting.  She has occasional low abdominal discomfort.  She is concerned about a kidney infection as she has had 1 in the past.  No blood in her urine.    Patient Active Problem List   Diagnosis    Normal delivery       No past medical history on file.    No past surgical history on file.    Current Outpatient Medications   Medication    ibuprofen (ADVIL/MOTRIN) 800 MG tablet    Prenatal Vit-Fe Fumarate-FA (PRENATAL MULTIVITAMIN W/IRON) 27-0.8 MG tablet     No current facility-administered medications for this visit.       No Known Allergies    No family history on file.    Social History     Socioeconomic History    Marital status: Single         Review of Systems  Pertinent items are noted in HPI.      Objective:     /66 (BP Location: Right arm, Patient Position: Sitting, Cuff Size: Adult Regular)   Pulse 117   Temp (!) 103  F (39.4  C) (Oral)   Resp 16   Wt  52.6 kg (116 lb)   LMP 10/03/2023 (Approximate)   SpO2 99%   BMI 19.60 kg/m    General Appearance:    Alert, pleasant, cooperative, no distress, appears stated age   Head:    Normocephalic, without obvious abnormality, atraumatic   Eyes:    Conjunctiva/corneas clear   Ears:    Normal TM's without erythema or bulging. Normal external ear canals, both ears   Nose:   Nares normal, septum midline, mucosa normal, no drainage    or sinus tenderness   Throat:   Lips, mucosa, and tongue normal; teeth and gums normal.  No tonsilar hypertrophy or exudate.   Neck:    Cardiovascular:   Supple, symmetrical, trachea midline, no adenopathy;     thyroid:  no enlargement/tenderness/nodules  Regular rate and rhythm, no murmurs, rubs, or gallops.   Lungs:      Abdomen:  Extremities:  Skin:         Clear to auscultation bilaterally without wheezes, rales, or rhonchi, respirations unlabored  Back: No CVA tenderness  Soft, nontender  Warm and well perfused  No rashes                         This note has been dictated using voice recognition software. Any grammatical or context distortions are unintentional and inherent to the software

## 2023-10-12 ENCOUNTER — TELEPHONE (OUTPATIENT)
Dept: FAMILY MEDICINE | Facility: CLINIC | Age: 23
End: 2023-10-12
Payer: COMMERCIAL

## 2023-10-12 ENCOUNTER — TELEPHONE (OUTPATIENT)
Dept: NURSING | Facility: CLINIC | Age: 23
End: 2023-10-12
Payer: COMMERCIAL

## 2023-10-12 DIAGNOSIS — M54.50 ACUTE BILATERAL LOW BACK PAIN WITHOUT SCIATICA: ICD-10-CM

## 2023-10-12 DIAGNOSIS — R50.9 FEVER, UNSPECIFIED FEVER CAUSE: ICD-10-CM

## 2023-10-12 RX ORDER — DOXYCYCLINE 100 MG/1
100 CAPSULE ORAL 2 TIMES DAILY
Qty: 14 CAPSULE | Refills: 0 | Status: SHIPPED | OUTPATIENT
Start: 2023-10-12

## 2023-10-12 RX ORDER — CEFDINIR 300 MG/1
300 CAPSULE ORAL 2 TIMES DAILY
Qty: 14 CAPSULE | Refills: 0 | Status: CANCELLED | OUTPATIENT
Start: 2023-10-12

## 2023-10-12 RX ORDER — DOXYCYCLINE 100 MG/1
100 CAPSULE ORAL 2 TIMES DAILY
Qty: 14 CAPSULE | Refills: 0 | Status: CANCELLED | OUTPATIENT
Start: 2023-10-12

## 2023-10-12 RX ORDER — CEFDINIR 300 MG/1
300 CAPSULE ORAL 2 TIMES DAILY
Qty: 14 CAPSULE | Refills: 0 | Status: SHIPPED | OUTPATIENT
Start: 2023-10-12

## 2023-10-12 NOTE — TELEPHONE ENCOUNTER
Coronavirus (COVID-19) Notification    Caller Name (Patient, parent, daughter/son, grandparent, etc)  Patient    Reason for call  Notify of Positive Coronavirus (COVID-19) lab results, assess symptoms,  review [facility] recommendations    Lab Result    Lab test:  2019-nCoV rRt-PCR or SARS-CoV-2 PCR    Oropharyngeal AND/OR nasopharyngeal swabs is POSITIVE for 2019-nCoV RNA/SARS-COV-2 PCR (COVID-19 virus)      Gather patient reported symptoms   Assessment   Current Symptoms at time of phone call, reported by patient: (if no symptoms, document: No symptoms] Back pain, fever not resolving, sore throat, headache, fatigue   Date of symptom(s) onset (if applicable) 10/09/23     If at time of call, Patients symptoms have worsened, the Patient should contact 911 or have someone drive them to Emergency Dept promptly:    If Patient calling 911, inform 911 personal that you have tested positive for the Coronavirus (COVID-19).  Place mask on and await 911 to arrive.  If Emergency Dept, If possible, please have another adult drive you to the Emergency Dept but you need to wear mask when in contact with other people.      Treatment Options:   Is patient interested in discussing COVID treatment? No  Is this a [place] or [place] Patient? No:     Are you an agent trained to scheduling? Yes.   Review information with Patient    Your result was positive. This means you have COVID-19 (coronavirus).    How can I protect others?    These guidelines are for isolating before returning to work, school or .    If you DO have symptoms  Stay home and away from others   For at least 5 days after your symptoms started, AND  You are fever free for 24 hours (with no medicine that reduces fever), AND  Your other symptoms are better  Wear a mask for 10 full days anytime you are around others    If you DON'T have symptoms  Stay home and away from others for at least 5 days after your positive test  Wear a mask for 10 full days  anytime you are around others    There may be different guidelines for healthcare facilities.  Please check with the specific sites before arriving.    If you have been told by a doctor that you were severely ill with COVID-19 or are immunocompromised, you should isolate for at least 10 days.    You should not go back to work until you meet the guidelines above for ending your home isolation. You don't need to be retested for COVID-19 before going back to work--studies show that you won't spread the virus if it's been at least 10 days since your symptoms started (or 20 days, if you have a weak immune system).    Employers, schools, and daycares: This is an official notice for this person's medical guidelines for returning in-person.  They must meet the above guidelines before going back to work, school or  in person.    You will receive a positive COVID-19 letter via [employer] or the mail soon with additional self-care information.    Would you like me to review some of that information with you now?  No    If you were tested for an upcoming procedure, please contact your provider for next steps.    KACIE DIETZ

## 2023-10-12 NOTE — TELEPHONE ENCOUNTER
Called and spoke to patient. Relayed provider's message. Patient understood and will  rx. No questions.    Justo Woods MA on 10/12/2023 at 5:24 PM

## 2023-10-12 NOTE — TELEPHONE ENCOUNTER
Patient calling to state the medication isn't covered at [facility] for her insurance so she is looking to have this resent in to [facility]     Address: [address] [place] [address] [address], [facility] [place] [address]  Departments: [facility]   [facility]  Hours:   Open ? Closes 10?PM  Pharmacy: Open ? Closes 1:30?PM ? Reopens 2?PM   More hours  Updated by this business 6 weeks ago  Order: [email or web address]     Providers   Phone: [phone]

## 2023-10-16 LAB
M GENITALIUM DNA SPEC QL NAA+PROBE: NOT DETECTED
M HOMINIS DNA SPEC QL NAA+PROBE: NOT DETECTED
U PARVUM DNA SPEC QL NAA+PROBE: DETECTED
U UREALYTICUM DNA SPEC QL NAA+PROBE: NOT DETECTED

## 2024-03-10 ENCOUNTER — HEALTH MAINTENANCE LETTER (OUTPATIENT)
Age: 24
End: 2024-03-10

## 2024-11-25 ENCOUNTER — OFFICE VISIT (OUTPATIENT)
Dept: FAMILY MEDICINE | Facility: CLINIC | Age: 24
End: 2024-11-25
Payer: COMMERCIAL

## 2024-11-25 VITALS
OXYGEN SATURATION: 98 % | BODY MASS INDEX: 22.66 KG/M2 | WEIGHT: 136 LBS | DIASTOLIC BLOOD PRESSURE: 71 MMHG | SYSTOLIC BLOOD PRESSURE: 105 MMHG | TEMPERATURE: 98.2 F | HEIGHT: 65 IN | HEART RATE: 63 BPM | RESPIRATION RATE: 16 BRPM

## 2024-11-25 DIAGNOSIS — G43.109 MIGRAINE WITH AURA AND WITHOUT STATUS MIGRAINOSUS, NOT INTRACTABLE: Primary | ICD-10-CM

## 2024-11-25 PROBLEM — N83.209 OVARIAN CYST: Status: ACTIVE | Noted: 2024-11-25

## 2024-11-25 PROCEDURE — 99214 OFFICE O/P EST MOD 30 MIN: CPT

## 2024-11-25 PROCEDURE — G2211 COMPLEX E/M VISIT ADD ON: HCPCS

## 2024-11-25 RX ORDER — SUMATRIPTAN 50 MG/1
50 TABLET, FILM COATED ORAL
Qty: 16 TABLET | Refills: 1 | Status: SHIPPED | OUTPATIENT
Start: 2024-11-25

## 2024-11-25 ASSESSMENT — PAIN SCALES - GENERAL: PAINLEVEL_OUTOF10: NO PAIN (0)

## 2024-11-25 ASSESSMENT — ENCOUNTER SYMPTOMS: HEADACHES: 1

## 2024-11-25 NOTE — PROGRESS NOTES
"  Assessment & Plan     Migraine with aura and without status migrainosus, not intractable  On going for many years now (5-10).   Will use ibuprofen as abortive treatment.  Prescription for sumatriptan provided, limit to use of 9 times or less per month. Side effects discussed.   Encouraged to follow up with eye doctor for prescription glasses as this may help improve symptoms.   Neurology referral placed. Patient will let me know if insurance will not cover this and I can certainly review brain MRI and preventative treatment with her.  The patient expressed understanding and was in agreement with the plan of care.   - SUMAtriptan (IMITREX) 50 MG tablet  Dispense: 16 tablet; Refill: 1  - Adult Neurology  Referral      The longitudinal plan of care for the diagnosis(es)/condition(s) as documented were addressed during this visit. Due to the added complexity in care, I will continue to support Raghu in the subsequent management and with ongoing continuity of care.      Brandon Krishnamurthy is a 24 year old, presenting for the following health issues:  Headache (Hx for over 10 years, comes and goes, concerns on what is causing, 1-2 times a week, varies, vision changes, did see eye doctor, needing correction but not sure if that is causing your headaches )      11/25/2024     3:31 PM   Additional Questions   Roomed by juanpablo miles cma   Accompanied by SO and baby     Headache     History of Present Illness       Headaches:   Since the patient's last clinic visit, headaches are: worsened  The patient is getting headaches:  Frequent  She is able to do normal daily activities when she has a migraine.  The patient is taking the following rescue/relief medications:  Ibuprofen (Advil, Motrin) and Tylenol   Patient states \"I get some relief\" from the rescue/relief medications.   The patient is taking the following medications to prevent migraines:  No medications to prevent migraines  In the past 4 weeks, the patient has " "gone to an Urgent Care or Emergency Room 0 times times due to headaches.   She is taking medications regularly.     History of severe migraines since she was in middle/highschool. Has aura symptoms of dizziness. Migraines are throbbing in nature. Will rotate which side of the head she experiences symptoms. Will have nausea at times. She notes light and sound sensitivity.     Migraine frequency varies. Feels she has at least 1 migraine per week, sometimes 2-3 migraines per week.     Will use ibuprofen for abortive treatment. With the worst migraine she will take 600 mg of ibuprofen.     She saw the eye doctor and her vision was poor and recommended corrective lenses. Patient reports she cannot afford these at this time.     Known migraine triggers include motion sickness, strong smells, over stimulation. She has more frequent migraines around her period.     Feels right now her migraines have increased in frequency.     Review of Systems  Constitutional, HEENT, cardiovascular, pulmonary, gi and gu systems are negative, except as otherwise noted.      Objective    /71 (BP Location: Right arm, Patient Position: Sitting, Cuff Size: Adult Regular)   Pulse 63   Temp 98.2  F (36.8  C)   Resp 16   Ht 1.638 m (5' 4.5\")   Wt 61.7 kg (136 lb)   LMP 11/18/2024 (Approximate)   SpO2 98%   Breastfeeding No   BMI 22.98 kg/m    Body mass index is 22.98 kg/m .  Physical Exam   GENERAL: alert and no distress  EYES: Eyes grossly normal to inspection, conjunctivae and sclerae normal  NECK: no visualized asymmetry, masses, or scars  RESP: normal respiratory effort  SKIN: no suspicious lesions or rashes on visualized skin  PSYCH: mentation appears normal, affect normal/bright        Signed Electronically by: Bere Wilson PA-C    "

## 2025-01-20 ENCOUNTER — OFFICE VISIT (OUTPATIENT)
Dept: NEUROLOGY | Facility: CLINIC | Age: 25
End: 2025-01-20
Payer: COMMERCIAL

## 2025-01-20 VITALS — HEART RATE: 79 BPM | SYSTOLIC BLOOD PRESSURE: 112 MMHG | DIASTOLIC BLOOD PRESSURE: 75 MMHG

## 2025-01-20 DIAGNOSIS — G43.009 MIGRAINE WITHOUT AURA AND WITHOUT STATUS MIGRAINOSUS, NOT INTRACTABLE: Primary | ICD-10-CM

## 2025-01-20 RX ORDER — RIZATRIPTAN BENZOATE 10 MG/1
10 TABLET ORAL
Qty: 18 TABLET | Refills: 3 | Status: SHIPPED | OUTPATIENT
Start: 2025-01-20

## 2025-01-20 ASSESSMENT — MIGRAINE DISABILITY ASSESSMENT (MIDAS)
ON A SCALE FROM 0-10 ON AVERAGE HOW PAINFUL WERE HEADACHES: 7
HOW MANY DAYS WAS YOUR PRODUCTIVITY CUT IN HALF BECAUSE OF HEADACHES: 0
HOW MANY DAYS DID YOU MISS WORK OR SCHOOL BECAUSE OF HEADACHES: 0
HOW MANY DAYS WAS HOUSEWORK PRODUCTIVITY CUT IN HALF DUE TO HEADACHES: 12
TOTAL SCORE: 15
HOW OFTEN WERE SOCIAL ACTIVITIES MISSED DUE TO HEADACHES: 3
HOW MANY DAYS IN THE PAST 3 MONTHS HAVE YOU HAD A HEADACHE: 12
HOW MANY DAYS DID YOU NOT DO HOUSEWORK BECAUSE OF HEADACHES: 0

## 2025-01-20 ASSESSMENT — HEADACHE IMPACT TEST (HIT 6)
HOW OFTEN HAVE YOU FELT TOO TIRED TO WORK BECAUSE OF YOUR HEADACHES: SOMETIMES
WHEN YOU HAVE HEADACHES HOW OFTEN IS THE PAIN SEVERE: SOMETIMES
WHEN YOU HAVE A HEADACHE HOW OFTEN DO YOU WISH YOU COULD LIE DOWN: VERY OFTEN
HIT6 TOTAL SCORE: 62
HOW OFTEN DO HEADACHES LIMIT YOUR DAILY ACTIVITIES: SOMETIMES
HOW OFTEN HAVE YOU FELT FED UP OR IRRITATED BECAUSE OF YOUR HEADACHES: VERY OFTEN
HOW OFTEN DID HEADACHS LIMIT CONCENTRATION ON WORK OR DAILY ACTIVITY: SOMETIMES

## 2025-01-20 NOTE — PROGRESS NOTES
"[facility]   Headache Neurology Consult  January 20, 2025     Balbir Pearson MRN# [id number]   YOB: 2000 Age: 24 year old     Requesting provider: Bere Wilson PA-C          Assessment and Recommendations:     Balbir Pearson is a 24 year old female who presents with headaches most consistent with migraine without aura and episodic migraine. Discussed the progressive worsening over time and new migraine diagnosis warranting a brain MRI evaluation.   Recommended the following plan.    Headache treatment plan:  Acute medication recommendations:  Rizatriptan, 10mg - may repeat at second dose  2 hours after the initial dose. Limit to no more than 9 days per month of use     Preventative medication recommendations:  Magnesium glycinate or magnesium oxide 400mg bedtime  Riboflavin (Vitamin B2) 400mg bedtime    Www.cefaly.com (preventative neurostimulation device)    Will meet in 3 months to assess efficacy.    Total time spent today was 61 minutes in chart review, history, exam and counseling.      Kristin Rodriguez MD  Neurology            Chief Complaint:     Chief Complaint   Patient presents with    Consult For     Migraines   Sumatriptan makes her \"heart feel sore\" which is worse while laying down           History is obtained from the patient and medical record.      Balbir Pearson is a 24 year old female whose first headache was in high school. She seldom had a headache in childhood (elementary school) and those were mild, more severe in high school.     She thinks that the frequency has worsened. It is more consistent. When she was pregnant, she did not have very many, but they returned in the frequency and severity thereafter. It did worsen on depo-provera.     She feels the pain on the left side of the head, rarely it will switch. Pain is a throbbing and at the worst, sharp pain. When she has one, it will come and go in waves. She will need to lay down and rest. Movement " would exacerbate and be associated with dizziness.   Severity of the pain is usually a 7/10 and at the worst, will be 9/10.   Blue lights photophobia, there is osmophobia, and no phonophobia and vigorous work, crying and periods are all triggers. She will have nausea from the osmophobia. There has been some vomiting with the headaches. She takes ibuprofen to stop this.    She will have dizziness and a sense of vertigo. She will sometime see stars for seconds on standing.     She has had a headache for about 4-8 days per month and of these about 2 are severe.    No fevers, night sweats, generally no loss of appetite, no weight loss. Sometimes headache do awaken from sleep and this is if she goes to sleep with a headache and sometimes it will occur at night and usually during a stretch. This awakening at night is not frequent and only with the most severe.  She has noticed that if her neck is adjusted in an abnormal way or strange body positions will contribute. Headaches last through the day (4 hours). The severe ones are longer.              Past Medical History:   Ovarian cysts          Past Surgical History:   None          Social History:     Social History     Socioeconomic History    Marital status: Single     Spouse name: Not on file    Number of children: Not on file    Years of education: Not on file    Highest education level: Not on file   Occupational History    Not on file   Tobacco Use    Smoking status: Never     Passive exposure: Never    Smokeless tobacco: Never   Vaping Use    Vaping status: Never Used   Substance and Sexual Activity    Alcohol use: None    Drug use: None    Sexual activity: Not on file   Other Topics Concern    Not on file   Social History Narrative    Not on file     Social Drivers of Health     Financial Resource Strain: High Risk (1/1/2022)    [facility]    Financial Resource Strain      Difficulty of Paying Living Expenses: Not on file     Difficulty of Paying Living Expenses: Not on file   Food Insecurity: Not on File (3/5/2020)    Received from [facility]    Food Insecurity     Food: 0   Transportation Needs: Not on File (3/5/2020)    Received from [facility]    Transportation Needs     Transportation: 0   Physical Activity: Not on File (3/5/2020)    Received from [facility]    Physical Activity     Physical Activity: 0   Stress: Not on File (3/5/2020)    Received from [facility]    Stress     Stress: 0   Social Connections: Unknown (2022)    [facility]    Social Connections     Frequency of Communication with Friends and Family: Not on file   Interpersonal Safety: Not on file   Housing Stability: Not on File (3/5/2020)    Received from [facility]    Housing Stability     Housin             Family History:   Grandmother, paternal with headaches and Alzheimer's as well as her brothers and sisters          Allergies:    No Known Allergies          Medications:   Acute headache medications:    ibuprofen (ADVIL/MOTRIN) 800 MG tablet, Take 1 tablet (800 mg) by mouth every 8 hours as needed for moderate pain (4-6), Disp: 30 tablet, Rfl: 1- takes this 8 days per month    SUMAtriptan (IMITREX) 50 MG tablet, Take 1 tablet (50 mg) by mouth at onset of headache for migraine. May repeat in 2 hours. Max 4 tablets/24 hours., Disp: 16 tablet, Rfl: 1- has only taken in twice due to chest soreness    Preventative headache medications:  None         Physical Exam:   /75 (BP Location: Right arm, Patient Position: Sitting, Cuff Size: Adult Regular)   Pulse 79   LMP 2024 (Approximate)    Physical Exam:   Neurologic:   Mental Status Exam: Alert, awake and oriented to situation. No dysarthria. Speech of normal fluency.   Cranial Nerves: Fundoscopic exam with clear disc margins bilaterally.  PERRLA, EOMs intact, no nystagmus, facial movements symmetric, facial sensation intact to light touch, hearing intact to conversation, trapezius and SCMs 5/5 bilaterally, tongue midline and fully mobile. No tongue atrophy.    Motor: Normal tone in all four extremities, no atrophy. 5/5 strength bilaterally in shoulder abduction, elbow extensors and flexors, wrist extensors and flexors, hip flexors, knee extensors and flexors, dorsi- and plantarflexion. No tremors or abnormal movements noted.   Sensory: Sensation intact to pinprick and vibration sensation on arms and legs bilaterally.    Coordination: Finger-nose-finger intact bilaterally.  Rapidly alternating movements intact bilaterally in the upper extremities.  Normal finger tapping bilaterally.  Intact heel-shin bilaterally.    Reflexes: 2+ and symmetric in triceps, biceps, brachioradialis, patellar, Achilles, and plantars downgoing bilaterally.   Gait: Normal gait.  Able to toe and heel walk.  Tandem gait normal.  Head: Normocephalic, atraumatic. No radiating pain with palpation over the supraorbital notches, occipital nerves.    Eyes: No conjunctival injection, no scleral icterus.           Data:     None

## 2025-01-20 NOTE — NURSING NOTE
"Chief Complaint   Patient presents with    Consult For     Migraines   Sumatriptan makes her \"heart feel sore\" which is worse while laying down       "

## 2025-01-20 NOTE — LETTER
"1/20/2025      Balbir Pearson  [address]  [place] [facility] [place] [address]      Dear Colleague,    Thank you for referring your patient, Balbir Pearson, to the [facility]. Please see a copy of my visit note below.    [facility]   Headache Neurology Consult  January 20, 2025     Balbir Pearson MRN# [id number]   YOB: 2000 Age: 24 year old     Requesting provider: Bere Wilson PA-C          Assessment and Recommendations:     Balbir Pearson is a 24 year old female who presents with headaches most consistent with migraine without aura and episodic migraine. Discussed the progressive worsening over time and new migraine diagnosis warranting a brain MRI evaluation.   Recommended the following plan.    Headache treatment plan:  Acute medication recommendations:  Rizatriptan, 10mg - may repeat at second dose  2 hours after the initial dose. Limit to no more than 9 days per month of use     Preventative medication recommendations:  Magnesium glycinate or magnesium oxide 400mg bedtime  Riboflavin (Vitamin B2) 400mg bedtime    [email or web address].cefaly.com (preventative neurostimulation device)    Will meet in 3 months to assess efficacy.    Total time spent today was 61 minutes in chart review, history, exam and counseling.      Kristin Rodriguez MD  Neurology            Chief Complaint:     Chief Complaint   Patient presents with     Consult For     Migraines   Sumatriptan makes her \"heart feel sore\" which is worse while laying down           History is obtained from the patient and medical record.      Balbir Pearson is a 24 year old female whose first headache was in high school. She seldom had a headache in childhood (elementary school) and those were mild, more severe in high school.     She thinks that the frequency has worsened. It is more consistent. When she was pregnant, she did not have very many, but they returned in the frequency and " severity thereafter. It did worsen on depo-provera.     She feels the pain on the left side of the head, rarely it will switch. Pain is a throbbing and at the worst, sharp pain. When she has one, it will come and go in waves. She will need to lay down and rest. Movement would exacerbate and be associated with dizziness.   Severity of the pain is usually a 7/10 and at the worst, will be 9/10.   Blue lights photophobia, there is osmophobia, and no phonophobia and vigorous work, crying and periods are all triggers. She will have nausea from the osmophobia. There has been some vomiting with the headaches. She takes ibuprofen to stop this.    She will have dizziness and a sense of vertigo. She will sometime see stars for seconds on standing.     She has had a headache for about 4-8 days per month and of these about 2 are severe.    No fevers, night sweats, generally no loss of appetite, no weight loss. Sometimes headache do awaken from sleep and this is if she goes to sleep with a headache and sometimes it will occur at night and usually during a stretch. This awakening at night is not frequent and only with the most severe.  She has noticed that if her neck is adjusted in an abnormal way or strange body positions will contribute. Headaches last through the day (4 hours). The severe ones are longer.              Past Medical History:   Ovarian cysts          Past Surgical History:   None          Social History:     Social History     Socioeconomic History     Marital status: Single     Spouse name: Not on file     Number of children: Not on file     Years of education: Not on file     Highest education level: Not on file   Occupational History     Not on file   Tobacco Use     Smoking status: Never     Passive exposure: Never     Smokeless tobacco: Never   Vaping Use     Vaping status: Never Used   Substance and Sexual Activity     Alcohol use: None     Drug use: None     Sexual activity: Not on file   Other Topics  Concern     Not on file   Social History Narrative     Not on file     Social Drivers of Health     Financial Resource Strain: High Risk (2022)    [facility]    Financial Resource Strain      Difficulty of Paying Living Expenses: Not on file      Difficulty of Paying Living Expenses: Not on file   Food Insecurity: Not on File (3/5/2020)    Received from [facility]    Food Insecurity      Food: 0   Transportation Needs: Not on File (3/5/2020)    Received from [facility]    Transportation Needs      Transportation: 0   Physical Activity: Not on File (3/5/2020)    Received from [facility]    Physical Activity      Physical Activity: 0   Stress: Not on File (3/5/2020)    Received from [facility]    Stress      Stress: 0   Social Connections: Unknown (2022)    [facility], [facility]    Social Connections      Frequency of Communication with Friends and Family: Not on file   Interpersonal Safety: Not on file   Housing Stability: Not on File (3/5/2020)    Received from [facility]    Housing Stability      Housin             Family History:   Grandmother, paternal with headaches and Alzheimer's as well as her brothers and sisters          Allergies:    No Known Allergies          Medications:   Acute headache medications:     ibuprofen (ADVIL/MOTRIN) 800 MG tablet, Take 1 tablet (800 mg) by mouth every 8 hours as needed for moderate pain (4-6), Disp: 30 tablet, Rfl: 1- takes this 8 days per month     SUMAtriptan (IMITREX) 50 MG tablet, Take 1 tablet (50 mg) by mouth at onset of headache for migraine. May repeat in 2 hours. Max 4 tablets/24 hours., Disp: 16 tablet, Rfl: 1- has only taken in twice due to chest soreness    Preventative headache medications:  None         Physical Exam:   /75 (BP Location: Right arm, Patient  Position: Sitting, Cuff Size: Adult Regular)   Pulse 79   LMP 11/18/2024 (Approximate)    Physical Exam:   Neurologic:   Mental Status Exam: Alert, awake and oriented to situation. No dysarthria. Speech of normal fluency.   Cranial Nerves: Fundoscopic exam with clear disc margins bilaterally. PERRLA, EOMs intact, no nystagmus, facial movements symmetric, facial sensation intact to light touch, hearing intact to conversation, trapezius and SCMs 5/5 bilaterally, tongue midline and fully mobile. No tongue atrophy.    Motor: Normal tone in all four extremities, no atrophy. 5/5 strength bilaterally in shoulder abduction, elbow extensors and flexors, wrist extensors and flexors, hip flexors, knee extensors and flexors, dorsi- and plantarflexion. No tremors or abnormal movements noted.   Sensory: Sensation intact to pinprick and vibration sensation on arms and legs bilaterally.    Coordination: Finger-nose-finger intact bilaterally.  Rapidly alternating movements intact bilaterally in the upper extremities.  Normal finger tapping bilaterally.  Intact heel-shin bilaterally.    Reflexes: 2+ and symmetric in triceps, biceps, brachioradialis, patellar, Achilles, and plantars downgoing bilaterally.   Gait: Normal gait.  Able to toe and heel walk.  Tandem gait normal.  Head: Normocephalic, atraumatic. No radiating pain with palpation over the supraorbital notches, occipital nerves.    Eyes: No conjunctival injection, no scleral icterus.           Data:     None         Again, thank you for allowing me to participate in the care of your patient.        Sincerely,        Kristin Rodriguez MD    Electronically signed

## 2025-01-20 NOTE — PATIENT INSTRUCTIONS
Magnesium glycinate or magnesium oxide 400mg bedtime  Riboflavin (Vitamin B2) 400mg bedtime    [email or web address].cefaly.com (preventative neurostimulation device)

## 2025-03-16 ENCOUNTER — HEALTH MAINTENANCE LETTER (OUTPATIENT)
Age: 25
End: 2025-03-16